# Patient Record
Sex: MALE
[De-identification: names, ages, dates, MRNs, and addresses within clinical notes are randomized per-mention and may not be internally consistent; named-entity substitution may affect disease eponyms.]

---

## 2021-05-25 ENCOUNTER — HOSPITAL ENCOUNTER (OUTPATIENT)
Dept: HOSPITAL 5 - US | Age: 41
Discharge: HOME | End: 2021-05-25
Attending: INTERNAL MEDICINE
Payer: COMMERCIAL

## 2021-05-25 DIAGNOSIS — K76.0: Primary | ICD-10-CM

## 2021-05-25 DIAGNOSIS — K76.89: ICD-10-CM

## 2021-05-25 DIAGNOSIS — K80.20: ICD-10-CM

## 2021-05-25 PROCEDURE — 76705 ECHO EXAM OF ABDOMEN: CPT

## 2021-05-25 NOTE — ULTRASOUND REPORT
ULTRASOUND ABDOMEN, LIMITED (RIGHT UPPER QUADRANT)



INDICATION / CLINICAL INFORMATION:

ABDOMINAL PAIN.



COMPARISON:

None available.



FINDINGS:

PANCREAS: Visualized portion shows no significant abnormality.

LIVER: Increased echogenicity noted throughout the hepatic parenchyma. The liver measures 13.8 cm in 
cc dimension. Tiny focal hyperechoic lesion noted of the left lobe just beneath the anterior hepatic 
margin measures 7 mm 

GALLBLADDER: Decompressed gallbladder is noted. Multiple calcified gallstones are noted within the ga
llbladder lumen, the largest measuring 1 cm near the gallbladder neck. No significant thickening of t
he gallbladder wall. No pericholecystic fluid. Sonographic Jimenez sign is negative.

BILE DUCTS: No significant abnormality. Common bile duct measures 2 mm. 



FREE FLUID: None.

ADDITIONAL FINDINGS: None.



IMPRESSION:

1. Diffuse hepatic steatosis.

2. 7 mm hyperechoic focal lesion left hepatic lobe just beneath the anterior hepatic margin likely re
presents a small hemangioma.

3. Cholelithiasis without evidence of acute cholecystitis.



Signer Name: Ghulam Martínez MD 

Signed: 5/25/2021 1:32 PM

Workstation Name: [id number]

## 2021-09-23 ENCOUNTER — HOSPITAL ENCOUNTER (OUTPATIENT)
Dept: HOSPITAL 5 - LAB | Age: 41
Discharge: HOME | End: 2021-09-23
Attending: SURGERY
Payer: COMMERCIAL

## 2021-09-23 DIAGNOSIS — K80.20: Primary | ICD-10-CM

## 2021-09-23 LAB
ALBUMIN SERPL-MCNC: 4.5 G/DL (ref 3.9–5)
ALT SERPL-CCNC: 14 UNITS/L (ref 7–56)
BASOPHILS # (AUTO): 0 K/MM3 (ref 0–0.1)
BASOPHILS NFR BLD AUTO: 0.4 % (ref 0–1.8)
BUN SERPL-MCNC: 18 MG/DL (ref 9–20)
BUN/CREAT SERPL: 20 %
CALCIUM SERPL-MCNC: 9.1 MG/DL (ref 8.4–10.2)
EOSINOPHIL # BLD AUTO: 0.2 K/MM3 (ref 0–0.4)
EOSINOPHIL NFR BLD AUTO: 4.9 % (ref 0–4.3)
HCT VFR BLD CALC: 41.7 % (ref 35.5–45.6)
HEMOLYSIS INDEX: 8
HGB BLD-MCNC: 14.4 GM/DL (ref 11.8–15.2)
LYMPHOCYTES # BLD AUTO: 1.3 K/MM3 (ref 1.2–5.4)
LYMPHOCYTES NFR BLD AUTO: 31.1 % (ref 13.4–35)
MCHC RBC AUTO-ENTMCNC: 35 % (ref 32–34)
MCV RBC AUTO: 97 FL (ref 84–94)
MONOCYTES # (AUTO): 0.5 K/MM3 (ref 0–0.8)
MONOCYTES % (AUTO): 10.8 % (ref 0–7.3)
PLATELET # BLD: 210 K/MM3 (ref 140–440)
RBC # BLD AUTO: 4.3 M/MM3 (ref 3.65–5.03)

## 2021-09-23 PROCEDURE — 85025 COMPLETE CBC W/AUTO DIFF WBC: CPT

## 2021-09-23 PROCEDURE — 36415 COLL VENOUS BLD VENIPUNCTURE: CPT

## 2021-09-23 PROCEDURE — 80053 COMPREHEN METABOLIC PANEL: CPT

## 2021-11-30 ENCOUNTER — HOSPITAL ENCOUNTER (OUTPATIENT)
Dept: HOSPITAL 5 - OR | Age: 41
Discharge: HOME | End: 2021-11-30
Attending: SURGERY
Payer: COMMERCIAL

## 2021-11-30 VITALS — DIASTOLIC BLOOD PRESSURE: 64 MMHG | SYSTOLIC BLOOD PRESSURE: 138 MMHG

## 2021-11-30 DIAGNOSIS — K80.10: Primary | ICD-10-CM

## 2021-11-30 DIAGNOSIS — Z79.899: ICD-10-CM

## 2021-11-30 DIAGNOSIS — Z87.891: ICD-10-CM

## 2021-11-30 DIAGNOSIS — Z98.890: ICD-10-CM

## 2021-11-30 DIAGNOSIS — K21.9: ICD-10-CM

## 2021-11-30 LAB
ALBUMIN SERPL-MCNC: 4.4 G/DL (ref 3.9–5)
ALT SERPL-CCNC: 12 UNITS/L (ref 7–56)
BUN SERPL-MCNC: 18 MG/DL (ref 9–20)
BUN/CREAT SERPL: 18 %
CALCIUM SERPL-MCNC: 8.9 MG/DL (ref 8.4–10.2)
HCT VFR BLD CALC: 41.5 % (ref 35.5–45.6)
HEMOLYSIS INDEX: 52
HGB BLD-MCNC: 13.8 GM/DL (ref 11.8–15.2)
MCHC RBC AUTO-ENTMCNC: 33 % (ref 32–34)
MCV RBC AUTO: 98 FL (ref 84–94)
PLATELET # BLD: 187 K/MM3 (ref 140–440)
RBC # BLD AUTO: 4.24 M/MM3 (ref 3.65–5.03)

## 2021-11-30 PROCEDURE — 85027 COMPLETE CBC AUTOMATED: CPT

## 2021-11-30 PROCEDURE — 47562 LAPAROSCOPIC CHOLECYSTECTOMY: CPT

## 2021-11-30 PROCEDURE — 88304 TISSUE EXAM BY PATHOLOGIST: CPT

## 2021-11-30 PROCEDURE — 80053 COMPREHEN METABOLIC PANEL: CPT

## 2021-11-30 PROCEDURE — 36415 COLL VENOUS BLD VENIPUNCTURE: CPT

## 2021-11-30 NOTE — SHORT STAY SUMMARY
Short Stay Documentation


Date of service: 11/30/21





- History


Principal diagnosis: symptomatic cholelithiasis


H&P: obtained from office





- Allergies and Medications


Current Medications: 


                                    Allergies





No Known Allergies Allergy (Unverified 11/15/21 12:17)


   





                                Home Medications











 Medication  Instructions  Recorded  Confirmed  Last Taken  Type


 


No Known Home Medications [No  11/30/21 11/30/21 Unknown History





Reported Home Medications]     








Active Medications





Acetaminophen (Acetaminophen 500 Mg Tab)  1,000 mg PO PREOP FRANTZ


   Stop: 11/30/21 21:00


   Last Admin: 11/30/21 07:30 Dose:  1,000 mg


   Documented by: 


Celecoxib (Celecoxib 200 Mg Cap)  400 mg PO PREOP NR


   Stop: 11/30/21 21:00


   Last Admin: 11/30/21 07:30 Dose:  400 mg


   Documented by: 


Hydromorphone HCl (Hydromorphone 1 Mg/1 Ml Inj)  0.25 mg IV Q10MIN PRN


   PRN Reason: Pain, Moderate (4-6)


   Stop: 11/30/21 17:00


Hydromorphone HCl (Hydromorphone 1 Mg/1 Ml Inj)  0.5 mg IV Q10MIN PRN


   PRN Reason: Pain , Severe (7-10)


   Stop: 11/30/21 17:00


Lactated Ringer's (Lactated Ringers)  1,000 mls @ 125 mls/hr IV AS DIRECT FRANTZ


   Last Admin: 11/30/21 06:40 Dose:  125 mls/hr


   Documented by: 


Cefazolin Sodium (Ancef/Sterile Water 2 Gm/20 Ml)  2 gm in 20 mls @ 80 mls/hr IV

PREOP NR; Protocol


   Stop: 11/30/21 21:00


Magnesium Oxide (Magnesium Oxide 400 Mg Tab)  400 mg PO PREOP FRANTZ


   Stop: 11/30/21 21:00


   Last Admin: 11/30/21 07:30 Dose:  400 mg


   Documented by: 


Methocarbamol (Methocarbamol 750 Mg Tab)  1,500 mg PO PREOP FRANTZ


   Stop: 11/30/21 21:00


   Last Admin: 11/30/21 07:30 Dose:  1,500 mg


   Documented by: 


Midazolam HCl (Midazolam 2 Mg/2 Ml Inj)  2 mg IV PREOP NR


   Stop: 11/30/21 23:59


   Last Admin: 11/30/21 07:50 Dose:  2 mg


   Documented by: 


Ondansetron HCl (Ondansetron 4 Mg/2 Ml Inj)  4 mg IV ONCE PRN


   PRN Reason: Nausea And Vomiting


   Stop: 11/30/21 18:00











- Brief post op/procedure progress note


Date of procedure: 11/30/21


Pre-op diagnosis: symptomatic cholelithiasis


Post-op diagnosis: other (chronic calculus cholecystitis)


Procedure: 





laparoscopic cholecystectomy


Anesthesia: GETA, local


Findings: 





Distended gallbladder filled with innumerable stones


Adhesions from omentum to gallbladder


Chronic scarring of gallbladder wall and neck of gallbladder


Surgeon: ARELIS GREER (Assistant: Maine Gomez RN)


Estimated blood loss: 50-100ml


Pathology: list (gallbladder)


Specimen disposition: to lab


Condition: stable





- Hospital course


Hospital course: 





Pt observed in PACU and discharged in stable condition when criteria met





- Disposition


Condition at discharge: Good


Disposition: 01 HOME / SELF CARE / HOMELESS





Short Stay Discharge Plan


Activity: other (no heavy lifting)


Diet: regular


Wound: open to air, per your surgeon's advice


Additional Instructions: 


see printed discharge instructions


Follow up with: 


TERYR ESPARZA MD [Primary Care Provider] - 7 Days


ARELIS GREER DO [Staff Physician] - 14 Days


Prescriptions: 


oxyCODONE /ACETAMINOPHEN [Percocet 5/325] 1 tab PO Q6HR PRN #20 tablet


 PRN Reason: Pain

## 2021-11-30 NOTE — OPERATIVE REPORT
Operative Report


Operative Report: 





Date of procedure: 11/30/21


Pre-op diagnosis: symptomatic cholelithiasis


Post-op diagnosis: other (chronic calculus cholecystitis)


Procedure: 





laparoscopic cholecystectomy


Anesthesia: GETA, local


Findings: 





Distended gallbladder filled with innumerable stones


Adhesions from omentum to gallbladder


Chronic scarring of gallbladder wall and neck of gallbladder


Surgeon: ARELIS GREER (Assistant: Maine Gomez RN)


Estimated blood loss: 50-100ml


Pathology: list (gallbladder)


Specimen disposition: to lab


Condition: stable


Hospital course: 


Pt observed in PACU and discharged in stable condition when criteria met





HPI an indication: 41-year-old male who presented to the surgery clinic with 

complaints of intermittent sharp right upper quadrant abdominal pain.  This has 

been ongoing for many years, associated with certain foods. +Nausea, no 

vomiting. U/s performed and revealed gallstones without cholecystitis or bile 

duct dilatation. It was recommended that the patient undergo cholecystectomy.  

All risks, benefits, alternatives to surgery were discussed in detail and 

questions answered.  Consent was obtained for laparoscopic, possible open 

cholecystectomy, possible cholangiogram. 





Procedure in detail: The patient was identified in the preoperative area and 

taken back to the operating room, placed on the operating room table in supine 

position.  After anesthesia was induced, the abdomen was prepped and draped in 

usual sterile fashion and timeout was performed.  Local anesthetic was 

infiltrated into all of the skin incision sites.  A supraumbilical incision was 

made through with a veress needle was inserted.  The Veress needle positioning 

was confirmed using saline drop test and the abdomen insufflated to 15 mmHg 

without incident.  The veress was removed and a 5 mm optiview trocar was placed 

through this incision. The abdomen was inspected and there was no underlying 

injury to any of the abdominal structures.  An additional 12 mm subxyphoid port,

5 mm supraumbilical, and 5 mm right lateral abdominal ports were placed under 

direct visualization.  The patient was then placed into reverse Trendelberg and 

tilted to the left.  The gallbladder was distended and could not be grasped. The

gallbladder was aspirated and 10 cc of thick brown bile was aspirated. The 

gallbladder fundus was grasped and retracted cephalad above the liver. Omental 

adhesions to the lateral aspect of the gallbladder were dissected using hook 

electrocautery.  The cystic duct and artery were carefully skeletonized. The 

patient had chronic scarring of the neck of the gallbladder. This added to the 

time of the operation. The medial and lateral peritoneal attachments to the 

gallbladder were dissected using a combination of blunt dissection with the 

[place] and hook electrocautery. The critical view was successfully obtained. 

The cystic artery was clipped first with 3 clips placed proximally and one 

distally and this was transected in between the clips using endoshears. Another 

branch of the cystic artery was wrapping around the anterior portion of the cyst

duct and around the lateral aspect of the gallbladder. This was fully dissected 

and clipped close to the gallbladder wall. 3 clips were placed proximally and 

one distally. This artery was transected in between the clips using endoshears. 

The cystic duct was the only structure seen attached to the gallbladder. 3 clips

were placed proximally and one distally and the cystic duct was transected in 

between the clips using endoshears.  The remainder of the gallbladder was 

dissected from the liver bed using electrocautery. The gallbladder was placed 

into a Endo Catch bag and removed from the abdomen via the 12mm port.  The 

gallbladder fossa was then inspected and there was no identifiable bleeding or 

bile leakage.  Hemostasis was ensured.  The clips on the cystic duct and artery 

were visualized and intact. The surgical bed was irrigated and the irrigant 

returned clear.  The patient was then placed into neutral position and 

kong's pouch was irrigated.  The 12 mm port fascia was closed with 

interrupted 0 Vicryl suture x2 using the Brad Tan device.  The remaining 

ports were removed under direct visualization.  Skin incisions were closed with 

4-0 Monocryl subcuticular stitches and skin glue.  All skin incisions were once 

again infiltrated with local anesthetic.





The gallbladder was examined on the back table and opened.  There were 

innumerable stones filling the gallbladder and thick brown bile. The cystic duct

was the only tubular structure entering the gallbladder.





At the end case all sponge, instrument, sharp counts were correct 2.  The 

patient was awoken from anesthesia, extubated, and taken to PACU in stable 

condition.

## 2021-11-30 NOTE — ANESTHESIA CONSULTATION
Anesthesia Consult and Med Hx


Date of service: 11/30/21





- Airway


Anesthetic Teeth Evaluation: Good


ROM Head & Neck: Adequate


Mental/Hyoid Distance: Adequate


Mallampati Class: Class III


Intubation Access Assessment: Probably Good





- Pre-Operative Health Status


ASA Pre-Surgery Classification: ASA2


Proposed Anesthetic Plan: General





- Pulmonary


Hx Smoking: No





- Central Nervous System


Hx Back Pain: Yes





- Gastrointestinal


Hx Gastroesophageal Reflux Disease: Yes





- Endocrine


Hx Liver Disease: Yes (Lesion on liver)





- Other Systems


Hx Substance Use: Yes (Amphet)


Hx Obesity: No